# Patient Record
Sex: MALE | Race: BLACK OR AFRICAN AMERICAN | Employment: FULL TIME | URBAN - METROPOLITAN AREA
[De-identification: names, ages, dates, MRNs, and addresses within clinical notes are randomized per-mention and may not be internally consistent; named-entity substitution may affect disease eponyms.]

---

## 2019-03-27 DIAGNOSIS — Z20.6 EXPOSURE TO HIV: Primary | ICD-10-CM

## 2019-03-27 DIAGNOSIS — Z20.6 EXPOSURE TO HIV: ICD-10-CM

## 2019-03-28 LAB
HIV AG/AB: NORMAL
HIV ANTIGEN: NORMAL
HIV-1 ANTIBODY: NORMAL
HIV-2 AB: NORMAL

## 2019-04-08 ENCOUNTER — OFFICE VISIT (OUTPATIENT)
Dept: INFECTIOUS DISEASES | Age: 24
End: 2019-04-08
Payer: COMMERCIAL

## 2019-04-08 VITALS
DIASTOLIC BLOOD PRESSURE: 98 MMHG | HEIGHT: 74 IN | BODY MASS INDEX: 26.69 KG/M2 | SYSTOLIC BLOOD PRESSURE: 140 MMHG | WEIGHT: 208 LBS | TEMPERATURE: 98.4 F

## 2019-04-08 DIAGNOSIS — Z20.6 EXPOSURE TO HIV: ICD-10-CM

## 2019-04-08 DIAGNOSIS — Z20.6 EXPOSURE TO HIV: Primary | ICD-10-CM

## 2019-04-08 LAB
A/G RATIO: 1.6 (ref 1.1–2.2)
ALBUMIN SERPL-MCNC: 4.8 G/DL (ref 3.4–5)
ALP BLD-CCNC: 92 U/L (ref 40–129)
ALT SERPL-CCNC: 19 U/L (ref 10–40)
ANION GAP SERPL CALCULATED.3IONS-SCNC: 11 MMOL/L (ref 3–16)
AST SERPL-CCNC: 17 U/L (ref 15–37)
BASOPHILS ABSOLUTE: 0 K/UL (ref 0–0.2)
BASOPHILS RELATIVE PERCENT: 0.4 %
BILIRUB SERPL-MCNC: 0.4 MG/DL (ref 0–1)
BUN BLDV-MCNC: 11 MG/DL (ref 7–20)
CALCIUM SERPL-MCNC: 10.2 MG/DL (ref 8.3–10.6)
CHLORIDE BLD-SCNC: 105 MMOL/L (ref 99–110)
CO2: 28 MMOL/L (ref 21–32)
CREAT SERPL-MCNC: 0.8 MG/DL (ref 0.9–1.3)
EOSINOPHILS ABSOLUTE: 0.1 K/UL (ref 0–0.6)
EOSINOPHILS RELATIVE PERCENT: 2.7 %
GFR AFRICAN AMERICAN: >60
GFR NON-AFRICAN AMERICAN: >60
GLOBULIN: 3 G/DL
GLUCOSE BLD-MCNC: 96 MG/DL (ref 70–99)
HCT VFR BLD CALC: 43.5 % (ref 40.5–52.5)
HEMOGLOBIN: 14.7 G/DL (ref 13.5–17.5)
LYMPHOCYTES ABSOLUTE: 2.4 K/UL (ref 1–5.1)
LYMPHOCYTES RELATIVE PERCENT: 60.7 %
MCH RBC QN AUTO: 32.1 PG (ref 26–34)
MCHC RBC AUTO-ENTMCNC: 33.8 G/DL (ref 31–36)
MCV RBC AUTO: 95.1 FL (ref 80–100)
MONOCYTES ABSOLUTE: 0.3 K/UL (ref 0–1.3)
MONOCYTES RELATIVE PERCENT: 7.2 %
NEUTROPHILS ABSOLUTE: 1.2 K/UL (ref 1.7–7.7)
NEUTROPHILS RELATIVE PERCENT: 29 %
PDW BLD-RTO: 13.6 % (ref 12.4–15.4)
PLATELET # BLD: 169 K/UL (ref 135–450)
PMV BLD AUTO: 10 FL (ref 5–10.5)
POTASSIUM SERPL-SCNC: 5 MMOL/L (ref 3.5–5.1)
RBC # BLD: 4.58 M/UL (ref 4.2–5.9)
SODIUM BLD-SCNC: 144 MMOL/L (ref 136–145)
TOTAL PROTEIN: 7.8 G/DL (ref 6.4–8.2)
WBC # BLD: 4 K/UL (ref 4–11)

## 2019-04-08 PROCEDURE — 99205 OFFICE O/P NEW HI 60 MIN: CPT | Performed by: INTERNAL MEDICINE

## 2019-04-08 RX ORDER — EMTRICITABINE AND TENOFOVIR DISOPROXIL FUMARATE 200; 300 MG/1; MG/1
1 TABLET, FILM COATED ORAL DAILY
Qty: 30 TABLET | Refills: 3 | Status: SHIPPED | OUTPATIENT
Start: 2019-04-08

## 2019-04-08 NOTE — PROGRESS NOTES
Infectious Diseases Consult Note    Reason for Consult:   PreEP evaluation  Requesting Physician:   Self / partner  Primary Care Physician:  No primary care provider on file. History Obtained From:   Patient, ARCHANA    CHIEF COMPLAINT:      Chief Complaint   Patient presents with    New Patient       HISTORY OF PRESENT ILLNESS:      25 yo   See PMH, surg, PSH, ROS, PE below    Pt interesting in HIV pre-exposure prophylaxis (PrEP). Sexual History:    Sexual relations with men only. Pt reports 1 partners in last 1 mo, 1 in last 3 mo, 1 in last 6 mo, hx unprotected intercourse, hx receptive and insertive anal intercourse. Hx sexual encounter with HIV + person (partner is HIV +, on meds and undetectable). No hx anonymous partner. No hx payment / drugs for sex. No hx STD (GC or chlamydia). No hx syphilis. Last HIV test - 3/27/19      Past Medical History:    MVP, cardiomegaly (seen by Cardiologist in past)    Past Surgical History:    Hypospadies repair as infant    Current Medications:    No current outpatient medications on file. No current facility-administered medications for this visit. Allergies:  Patient has no known allergies. 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 No weight loss. No visual change, eye pain, eye discharge. No oral lesion, sore throat, dysphagia. Denies cough / sputum. Denies chest pain, palpitations. Denies n / v / abd pain. No diarrhea. Denies dysuria or change in urinary function. Denies joint swelling or pain. No myalgia, arthralgia.   Denies skin changes, itching  Denies new / worse depression, psychiatric symptoms  Denies focal weakness, sensory change or other neurologic symptom  No symptoms endocrinopathy. No symptoms hematologic disease. PHYSICAL EXAM:    Vitals:  See intake vitals including weight    GENERAL: No apparent distress. HEENT: Membranes moist, no oral lesion  NECK:  Supple  LYMPH: No adenopathy   LUNGS: Clear b/l, no rales, no dullness  CARDIAC: RRR, no murmur appreciated  ABD:  + BS, soft / NT  EXT:  No rash, no edema, no lesions  NEURO: No focal neurologic findings  PSYCH: Orientation, sensorium, mood normal      DATA:    See EPIC    IMPRESSION    PrEP protocol:  - Risk assessment and HIV education: (initial and each visit) sexual, needle use exposure  - Baseline evaluation: hx, PE /  exam, Cr, HIV antibody, Hep B serologies, HCV antibody, RPR, GC / chlamydia DNA probe  - After starting medication: (2-4 weeks) evaluate adherence / tolerance  - Follow-up: (every 3 mo) risk assessment, medical tolerance / adherence, HIV ab; (every 6 mo) Cr, new STD   screen    PReP evaluation: see HPI, no current sx STD / HIV, reasonable candidate for PReP    RECOMMENDATIONS:      Check , BMP, RPR, HBV serology, HAV antibody, HCV ab, urine for chlamydia / GC [HIV antibody 3/27/19]  Educated pt on HIV, STD, PrEP  Will initiate PrEP / [place] [facility], wait to start medication till after HIV screen results    Referral to Cardiology - has not had f/u for previously dx condition, will obtain records  Referral to [facility] over 60 minutes on visit (including history, physical exam, review of data, development and implementation of treatment plan and coordination of care. - Over 50% of time spent in pt counseling and education.     Lety Belle MD

## 2019-04-09 LAB
C. TRACHOMATIS DNA ,URINE: NEGATIVE
HBV SURFACE AB TITR SER: <3.5 MIU/ML
HEPATITIS B SURFACE ANTIGEN INTERPRETATION: NORMAL
HEPATITIS C ANTIBODY INTERPRETATION: NORMAL
N. GONORRHOEAE DNA, URINE: NEGATIVE
TOTAL SYPHILLIS IGG/IGM: NORMAL

## 2019-04-10 LAB
HAV AB SERPL IA-ACNC: POSITIVE
HEPATITIS B CORE TOTAL ANTIBODY: NEGATIVE

## 2021-02-14 ENCOUNTER — HOSPITAL ENCOUNTER (EMERGENCY)
Age: 26
Discharge: HOME | End: 2021-02-14
Payer: COMMERCIAL

## 2021-02-14 ENCOUNTER — APPOINTMENT (OUTPATIENT)
Dept: GENERAL RADIOLOGY | Age: 26
End: 2021-02-14
Payer: COMMERCIAL

## 2021-02-14 VITALS
HEIGHT: 74 IN | BODY MASS INDEX: 29.99 KG/M2 | SYSTOLIC BLOOD PRESSURE: 146 MMHG | DIASTOLIC BLOOD PRESSURE: 89 MMHG | RESPIRATION RATE: 16 BRPM | HEART RATE: 88 BPM | TEMPERATURE: 98.8 F | OXYGEN SATURATION: 100 % | WEIGHT: 233.69 LBS

## 2021-02-14 DIAGNOSIS — Z23 NEED FOR TETANUS BOOSTER: ICD-10-CM

## 2021-02-14 DIAGNOSIS — T14.8XXA SUTURE OF SKIN WOUND: ICD-10-CM

## 2021-02-14 DIAGNOSIS — S61.212A LACERATION OF RIGHT MIDDLE FINGER WITHOUT FOREIGN BODY WITHOUT DAMAGE TO NAIL, INITIAL ENCOUNTER: Primary | ICD-10-CM

## 2021-02-14 PROCEDURE — 6360000002 HC RX W HCPCS: Performed by: NURSE PRACTITIONER

## 2021-02-14 PROCEDURE — 99283 EMERGENCY DEPT VISIT LOW MDM: CPT

## 2021-02-14 PROCEDURE — 12001 RPR S/N/AX/GEN/TRNK 2.5CM/<: CPT

## 2021-02-14 PROCEDURE — 90471 IMMUNIZATION ADMIN: CPT | Performed by: NURSE PRACTITIONER

## 2021-02-14 PROCEDURE — 73140 X-RAY EXAM OF FINGER(S): CPT

## 2021-02-14 PROCEDURE — 90715 TDAP VACCINE 7 YRS/> IM: CPT | Performed by: NURSE PRACTITIONER

## 2021-02-14 RX ADMIN — TETANUS TOXOID, REDUCED DIPHTHERIA TOXOID AND ACELLULAR PERTUSSIS VACCINE, ADSORBED 0.5 ML: 5; 2.5; 8; 8; 2.5 SUSPENSION INTRAMUSCULAR at 22:33

## 2021-02-14 ASSESSMENT — PAIN DESCRIPTION - PROGRESSION: CLINICAL_PROGRESSION: GRADUALLY WORSENING

## 2021-02-14 ASSESSMENT — PAIN SCALES - GENERAL: PAINLEVEL_OUTOF10: 0

## 2021-02-14 ASSESSMENT — PAIN DESCRIPTION - FREQUENCY: FREQUENCY: CONTINUOUS

## 2021-02-14 ASSESSMENT — PAIN - FUNCTIONAL ASSESSMENT: PAIN_FUNCTIONAL_ASSESSMENT: PREVENTS OR INTERFERES SOME ACTIVE ACTIVITIES AND ADLS

## 2021-02-14 ASSESSMENT — PAIN DESCRIPTION - DESCRIPTORS: DESCRIPTORS: ACHING;THROBBING;TENDER

## 2021-02-15 NOTE — ED NOTES
Pt right hand middle finger with laceration to middle of finger was cleaned and dry. Pt tolerated well. Estevan ARCHER-CNP was notified.       Lam [place]  02/14/21 [id number]

## 2021-02-15 NOTE — ED PROVIDER NOTES
[address]  [address] [address]  Dept: [phone]  Loc: [address] ENCOUNTER        This patient was not seen or evaluated by the attending physician. I evaluated this patient, the attending physician was available for consultation. CHIEF COMPLAINT    Chief Complaint   Patient presents with    Laceration     right middle finger laceration. cut on glass washing dishes. bleeding controlled. unknown tetanus status       HPI    Ashley Baig is a 23 y.o. male who lacerated right middle digit. The mechanism of the injury was he was cleaning a porcelain bowl when it broke and cut his finger. This occurred shortly prior to arrival.  Associated bleeding was alleviated by pressure. The pain is 6/10 in severity. States that he cannot remember his last tetanus. He came to the ED for further evaluation and treatment. REVIEW OF SYSTEMS    Neurologic: No numbness or weakness distal to the wound  Skin: see HPI  Musculoskeletal: No bony deformity  Immunization: Tetanus status unknown, will be updated in the ED    PAST MEDICAL & SURGICAL HISTORY    No past medical history on file. No past surgical history on file.     CURRENT MEDICATIONS    Current Outpatient Rx   Medication Sig Dispense Refill    emtricitabine-tenofovir (TRUVADA) 200-300 MG per tablet Take 1 tablet by mouth daily 30 tablet 3       ALLERGIES    No Known Allergies    SOCIAL & FAMILY HISTORY    Social History     Socioeconomic History    Marital status: Single     Spouse name: Not on file    Number of children: Not on file    Years of education: Not on file    Highest education level: Not on file   Occupational History    Not on file   Social Needs    Financial resource strain: Not on file    Food insecurity     Worry: Not on file     Inability: Not on file    Transportation needs     Medical: Not on file     Non-medical: Not on file   Tobacco Use    Smoking status: Current Some Day Smoker     Types: Cigarettes    Smokeless tobacco: Never Used   Substance and Sexual Activity    Alcohol use: Not on file    Drug use: Not on file    Sexual activity: Not on file   Lifestyle    Physical activity     Days per week: Not on file     Minutes per session: Not on file    Stress: Not on file   Relationships    Social connections     Talks on phone: Not on file     Gets together: Not on file     Attends Judaism service: Not on file     Active member of club or organization: Not on file     Attends meetings of clubs or organizations: Not on file     Relationship status: Not on file    Intimate partner violence     Fear of current or ex partner: Not on file     Emotionally abused: Not on file     Physically abused: Not on file     Forced sexual activity: Not on file   Other Topics Concern    Not on file   Social History Narrative    Not on file     No family history on file. PHYSICAL EXAM    VITAL SIGNS: BP (!) 146/89   Pulse 88   Temp 98.8 °F (37.1 °C) (Temporal)   Resp 16   Ht 6' 2\" (1.88 m)   Wt 233 lb 11 oz (106 kg)   SpO2 100%   BMI 30.00 kg/m²   Constitutional:  Well developed, well-nourished  HENT:  atraumatic, no trismus  NECK:  Supple, No neck swelling  Respiratory:  No respiratory distress  Cardiovascular:  No JVD   Neurologic: Motor and sensory distal to the wound is intact and normal, patient is awake, alert, no slurred speech  Vascular: right radial pulse 2+, capillary refill less than 2 seconds  Musculoskeletal:  No edema or deformity  Integument:  +1 cm laceration localized over the PIP joint of the right middle finger, the depth down to the subcutaneous tissue, there is no foreign body in the wound, there is no tendon or bone exposed in the wound. PROCEDURE      Digital Block Procedure Note  Indication: laceration repair  Procedure:  The patient was positioned appropriately and the skin of the affected digit was cleansed with chlorhexidine. Approximately 3 mL of 1% lidocaine without epinephrine was infiltrated around the base of the digit at the appropriate landmarks. The patient tolerated the procedure well. Complications: none        Laceration Repair Procedure Note  Performed by: myself  Consent:  Verbal consent obtained by patient. Risk of infection and pain discussed, as well as alternatives. Anesthesia:  The patient was placed in the appropriate position and anesthesia obtained by digital block infiltration using 1% Lidocaine without epinephrine. Repair type:  simple      Pre-procedure:  Patient was prepped and draped in usual sterile fashion  Laceration details:  Location: right middle digit  Length (cm):  1  Preparation:  Patient was prepped and draped in usual sterile fashion  Exploration: Hemostasis achieved with direct pressure, entire depth of wound probed and visualized in a bloodless field   Contaminated: no  Treatment:  Amount of cleaning:  Extensive  Irrigation solution:  High pressure tap water  Visualized foreign bodies/material removed: no  Skin repair:   Repair method:  Sutures  Suture size:  4-0  Suture material:  Nylon  Suture technique:  Simple interrupted  Approximation:  Close  Number of sutures: 4  Dressing:  Sterile dressing and antibiotic ointment  Patient tolerance of procedure: Tolerated well, no immediate complications    RADIOLOGY  XR FINGER RIGHT (MIN 2 VIEWS)   Final Result   Negative study             ED COURSE & MEDICAL DECISION MAKING    See chart for details of any medications ordered    Differential diagnosis: Tendon laceration, neurologic injury, vascular injury, involvement of bone that could lead to osteomyelitis, retained foreign body, delayed bacterial skin infection, other    No evidence of neurovascular injury on physical exam.  No evidence of tendon laceration. Plain films as above.     I estimate there is LOW risk for CELLULITIS, COMPARTMENT SYNDROME, NECROTIZING

## 2021-02-24 ENCOUNTER — HOSPITAL ENCOUNTER (EMERGENCY)
Age: 26
Discharge: HOME | End: 2021-02-24
Payer: COMMERCIAL

## 2021-02-24 VITALS
DIASTOLIC BLOOD PRESSURE: 80 MMHG | OXYGEN SATURATION: 99 % | RESPIRATION RATE: 17 BRPM | TEMPERATURE: 98 F | SYSTOLIC BLOOD PRESSURE: 129 MMHG | HEART RATE: 74 BPM

## 2021-02-24 DIAGNOSIS — Z48.02 VISIT FOR SUTURE REMOVAL: Primary | ICD-10-CM

## 2021-02-24 PROCEDURE — 99282 EMERGENCY DEPT VISIT SF MDM: CPT

## 2021-02-24 NOTE — ED PROVIDER NOTES
[address]  [address] [address]  [address]: [phone]: [phone]  eMERGENCY dEPARTMENT eNCOUnter        [address] [facility]    Chief Complaint   Patient presents with    Suture / Staple Removal     sutures to right middle finger. pt here for removal        HPI    Ashley Baig is a 23 y.o. male who is here for suture removal. The patient has no localized pain. No drainage, erythema, or warmth. No other complaints at this time. REVIEW OF SYSTEMS    None    PAST MEDICAL & SURGICAL HISTORY    No past medical history on file. No past surgical history on file. CURRENT MEDICATIONS    See nurses notes    ALLERGIES    No Known Allergies    PHYSICAL EXAM    Constitutional:  Appears comfortable  NEUROLOGIC: The patient is awake, alert, no slurred speech  Integument:  Wound appears clean/dry/intact. There is no redness induration or drainage. RADIOLOGY  none    ED COURSE & MEDICAL DECISION MAKING      Patient presents to the ED with the HPI noted above. No evidence of infection noted. Educated signs of external seek reevaluation develop. Provided [facility] list for PCP establishment. Discharged home in stable condition. The patient tolerated their visit well. I have discussed the findings of today's workup with the patient and addressed the patient's questions and concerns. Important warning signs as well as new or worsening symptoms which would necessitate immediate return to the ED were discussed. The plan is to discharge from the ED at this time, and the patient is in stable condition. The patient acknowledged understanding is agreeable with this plan. Procedure Note  Details of the Procedure: Sutures were removed using the standard sterile instruments, no complications. 4 sutures removed without complication. FINAL IMPRESSION    1.  Visit for suture removal        PLAN  Outpatient follow-up (see EMR)      (Please note that this note was completed with a voice recognition program.  Every attempt was made to edit the dictations, but inevitably there remain words that are mis-transcribed.)       Ekaterina Martinez PA-C  02/24/21 1420